# Patient Record
Sex: FEMALE | Race: WHITE | NOT HISPANIC OR LATINO | Employment: UNEMPLOYED | URBAN - METROPOLITAN AREA
[De-identification: names, ages, dates, MRNs, and addresses within clinical notes are randomized per-mention and may not be internally consistent; named-entity substitution may affect disease eponyms.]

---

## 2020-01-24 ENCOUNTER — OFFICE VISIT (OUTPATIENT)
Dept: URGENT CARE | Facility: MEDICAL CENTER | Age: 35
End: 2020-01-24
Payer: COMMERCIAL

## 2020-01-24 VITALS
OXYGEN SATURATION: 98 % | RESPIRATION RATE: 18 BRPM | SYSTOLIC BLOOD PRESSURE: 154 MMHG | HEIGHT: 65 IN | WEIGHT: 203 LBS | TEMPERATURE: 98 F | DIASTOLIC BLOOD PRESSURE: 90 MMHG | BODY MASS INDEX: 33.82 KG/M2 | HEART RATE: 98 BPM

## 2020-01-24 DIAGNOSIS — B96.89 ACUTE BACTERIAL BRONCHITIS: Primary | ICD-10-CM

## 2020-01-24 DIAGNOSIS — J20.8 ACUTE BACTERIAL BRONCHITIS: Primary | ICD-10-CM

## 2020-01-24 LAB — S PYO AG THROAT QL: NEGATIVE

## 2020-01-24 PROCEDURE — 87880 STREP A ASSAY W/OPTIC: CPT | Performed by: PHYSICIAN ASSISTANT

## 2020-01-24 PROCEDURE — G0382 LEV 3 HOSP TYPE B ED VISIT: HCPCS | Performed by: PHYSICIAN ASSISTANT

## 2020-01-24 RX ORDER — ALBUTEROL SULFATE 90 UG/1
2 AEROSOL, METERED RESPIRATORY (INHALATION) EVERY 6 HOURS PRN
Qty: 18 G | Refills: 0 | Status: SHIPPED | OUTPATIENT
Start: 2020-01-24 | End: 2020-02-14 | Stop reason: ALTCHOICE

## 2020-01-24 RX ORDER — AZITHROMYCIN 250 MG/1
TABLET, FILM COATED ORAL
Qty: 6 TABLET | Refills: 0 | Status: SHIPPED | OUTPATIENT
Start: 2020-01-24 | End: 2020-01-28

## 2020-01-24 RX ORDER — LISINOPRIL 20 MG/1
TABLET ORAL
COMMUNITY
Start: 2019-12-30

## 2020-01-24 RX ORDER — FLUTICASONE PROPIONATE 50 MCG
2 SPRAY, SUSPENSION (ML) NASAL DAILY
Qty: 1 BOTTLE | Refills: 0 | Status: SHIPPED | OUTPATIENT
Start: 2020-01-24 | End: 2020-09-16

## 2020-01-25 NOTE — PROGRESS NOTES
[facility] [employer] [facility] [facility]        NAME: Jaqui Barajas is a 37 y o  female  : 1985    MRN: [id number]  DATE: 2020  TIME: 7:45 PM    Assessment and Plan   Acute bacterial bronchitis [J20 8, B96 89]  1  Acute bacterial bronchitis  POCT rapid strepA    azithromycin (ZITHROMAX) 250 mg tablet    fluticasone (FLONASE) 50 mcg/act nasal spray    albuterol (VENTOLIN HFA) 90 mcg/act inhaler         Patient Instructions     Rapid Strep testing in the office today was negative  Continue with supportive care: warm salt water gargles, lozenges, warm humidified air, saline nasal spray, plenty of fluids, plenty of rest, and over the counter pain medication as needed  Follow up with PCP in 3-5 days if symptoms do not resolve  Proceed to the ER with worsening throat pain, fever, chills,difficulties breathing, difficulties tolerating oral intake  Chief Complaint     Chief Complaint   Patient presents with    Cold Like Symptoms     Patient presents with chest congestion, cough with mucous, and body aches for 2 weeks  She reports some hot sweats but denies fever  She notes that she has had some shortness of breath with coughing or going up stairs  She is taking mucinex and advil for her symptoms  History of Present Illness       57-year-old female with no significant past medical history presents with cough, congestion x2 weeks  Patient states that her symptoms have been progressive for her  She states that there was 1 time where she thought her symptoms were resolving, but then she began to cough more  Patient states that her daughter had the flu and she was unable to get a flu shot yet this season  Patient states that she has had chills, but since taken her temperature at home  Patient states that she is eating and drinking normally  She has been using over-the-counter medications to help with her symptoms  Cough   This is a new problem   Episode onset: x 2 weeks  The problem has been unchanged  The cough is productive of sputum (green )  Associated symptoms include chills, a fever ("subjective" ), headaches (generalized ), myalgias, shortness of breath and sweats  Pertinent negatives include no chest pain or postnasal drip  The symptoms are aggravated by lying down and cold air  Risk factors: denies smoking history, denies recet travel  She has tried OTC cough suppressant (mucinex ) for the symptoms  The treatment provided mild relief  Her past medical history is significant for bronchitis  has never been hospitlized for rep  illness        Review of Systems   Review of Systems   Constitutional: Positive for appetite change, chills and fever ("subjective" )  HENT: Negative for congestion and postnasal drip  Respiratory: Positive for cough, chest tightness and shortness of breath  Cardiovascular: Negative for chest pain and palpitations  Gastrointestinal: Negative  Genitourinary: Negative  Musculoskeletal: Positive for myalgias  Neurological: Positive for headaches (generalized )  Negative for dizziness and light-headedness           Current Medications       Current Outpatient Medications:     albuterol (VENTOLIN HFA) 90 mcg/act inhaler, Inhale 2 puffs every 6 (six) hours as needed for wheezing or shortness of breath (coughing), Disp: 18 g, Rfl: 0    azithromycin (ZITHROMAX) 250 mg tablet, Take 2 tablets today then 1 tablet daily x 4 days, Disp: 6 tablet, Rfl: 0    fluticasone (FLONASE) 50 mcg/act nasal spray, 2 sprays into each nostril daily, Disp: 1 Bottle, Rfl: 0    lisinopril (ZESTRIL) 20 mg tablet, , Disp: , Rfl:     Current Allergies     Allergies as of 01/24/2020 - Reviewed 01/24/2020   Allergen Reaction Noted    Penicillins Hives 01/24/2020            The following portions of the patient's history were reviewed and updated as appropriate: allergies, current medications, past family history, past medical history, past social history, past surgical history and problem list      Past Medical History:   Diagnosis Date    Hypertension        Past Surgical History:   Procedure Laterality Date    CHOLECYSTECTOMY         History reviewed  No pertinent family history  Medications have been verified  Objective   /90   Pulse 98   Temp 98 °F (36 7 °C) (Tympanic)   Resp 18   Ht 5' 5" (1 651 m)   Wt 92 1 kg (203 lb)   LMP 12/31/2019   SpO2 98%   BMI 33 78 kg/m²        Physical Exam     Physical Exam   Constitutional: Vital signs are normal  She appears well-developed and well-nourished  She is cooperative  Non-toxic appearance  She does not appear ill  No distress  There is no conversational dyspnea at rest    HENT:   Head: Normocephalic and atraumatic  Right Ear: Hearing, tympanic membrane, external ear and ear canal normal    Left Ear: Hearing, tympanic membrane, external ear and ear canal normal    Nose: Rhinorrhea present  No mucosal edema  Right sinus exhibits no maxillary sinus tenderness and no frontal sinus tenderness  Left sinus exhibits no maxillary sinus tenderness and no frontal sinus tenderness  Mouth/Throat: Uvula is midline and mucous membranes are normal  No uvula swelling  Oropharyngeal exudate and posterior oropharyngeal erythema present  Tonsils are 1+ on the right  Tonsils are 1+ on the left  No tonsillar exudate  Eyes: Pupils are equal, round, and reactive to light  Conjunctivae, EOM and lids are normal    Neck: Neck supple  Cardiovascular: Normal rate, regular rhythm, S1 normal, S2 normal and normal heart sounds  No murmur heard  Pulmonary/Chest: Effort normal and breath sounds normal  No stridor  No respiratory distress  She has no decreased breath sounds  She has no wheezes  She has no rhonchi  She has no rales  Abdominal: Soft  Bowel sounds are normal  There is no tenderness  Lymphadenopathy:     She has no cervical adenopathy  Neurological: She is alert  Skin: Skin is warm and dry  No rash noted   She is not diaphoretic  Psychiatric: She has a normal mood and affect  POCT strep A negative in office today

## 2020-01-25 NOTE — PATIENT INSTRUCTIONS
Began taking antibiotics, Flonase, albuterol inhaler as prescribed  Take antibiotic with food to prevent stomach upset  Rapid Strep testing in the office today was negative  Continue with supportive care: warm salt water gargles, lozenges, warm humidified air, saline nasal spray, plenty of fluids, plenty of rest, and over the counter pain medication as needed  Follow up with PCP in 3-5 days if symptoms do not resolve  Proceed to the ER with worsening throat pain, fever, chills,difficulties breathing, difficulties tolerating oral intake  Acute Bronchitis   WHAT YOU NEED TO KNOW:   Acute bronchitis is swelling and irritation in the air passages of your lungs  This irritation may cause you to cough or have other breathing problems  Acute bronchitis often starts because of another illness, such as a cold or the flu  The illness spreads from your nose and throat to your windpipe and airways  Bronchitis is often called a chest cold  Acute bronchitis lasts about 3 to 6 weeks and is usually not a serious illness  Your cough can last for several weeks  DISCHARGE INSTRUCTIONS:   Return to the emergency department if:   · You cough up blood  · Your lips or fingernails turn blue  · You feel like you are not getting enough air when you breathe  Contact your healthcare provider if:   · You have a fever  · Your breathing problems do not go away or get worse  · Your cough does not get better within 4 weeks  · You have questions or concerns about your condition or care  Self-care:   · Get more rest   Rest helps your body to heal  Slowly start to do more each day  Rest when you feel it is needed  · Avoid irritants in the air  Avoid chemicals, fumes, and dust  Wear a face mask if you must work around dust or fumes  Stay inside on days when air pollution levels are high  If you have allergies, stay inside when pollen counts are high   Do not use aerosol products, such as spray-on deodorant, bug spray, and hair spray  · Do not smoke or be around others who smoke  Nicotine and other chemicals in cigarettes and cigars damages the cilia that move mucus out of your lungs  Ask your healthcare provider for information if you currently smoke and need help to quit  E-cigarettes or smokeless tobacco still contain nicotine  Talk to your healthcare provider before you use these products  · Drink liquids as directed  Liquids help keep your air passages moist and help you cough up mucus  You may need to drink more liquids when you have acute bronchitis  Ask how much liquid to drink each day and which liquids are best for you  · Use a humidifier or vaporizer  Use a cool mist humidifier or a vaporizer to increase air moisture in your home  This may make it easier for you to breathe and help decrease your cough  Decrease risk for acute bronchitis:   · Get the vaccinations you need  Ask your healthcare provider if you should get vaccinated against the flu or pneumonia  · Prevent the spread of germs  You can decrease your risk of acute bronchitis and other illnesses by doing the following:     [facility] your hands often with soap and water  Carry germ-killing hand lotion or gel with you  You can use the lotion or gel to clean your hands when soap and water are not available  ¨ Do not touch your eyes, nose, or mouth unless you have washed your hands first     ¨ Always cover your mouth when you cough to prevent the spread of germs  It is best to cough into a tissue or your shirt sleeve instead of into your hand  Ask those around you cover their mouths when they cough  ¨ Try to avoid people who have a cold or the flu  If you are sick, stay away from others as much as possible  Medicines: Your healthcare provider may  give you any of the following:  · Ibuprofen or acetaminophen  are medicines that help lower your fever  They are available without a doctor's order   Ask your healthcare provider which medicine is right for you  Ask how much to take and how often to take it  Follow directions  These medicines can cause stomach bleeding if not taken correctly  Ibuprofen can cause kidney damage  Do not take ibuprofen if you have kidney disease, an ulcer, or allergies to aspirin  Acetaminophen can cause liver damage  Do not take more than 4,000 milligrams in 24 hours  · Decongestants  help loosen mucus in your lungs and make it easier to cough up  This can help you breathe easier  · Cough suppressants  decrease your urge to cough  If your cough produces mucus, do not take a cough suppressant unless your healthcare provider tells you to  Your healthcare provider may suggest that you take a cough suppressant at night so you can rest     · Inhalers  may be given  Your healthcare provider may give you one or more inhalers to help you breathe easier and cough less  An inhaler gives your medicine to open your airways  Ask your healthcare provider to show you how to use your inhaler correctly  · Take your medicine as directed  Contact your healthcare provider if you think your medicine is not helping or if you have side effects  Tell him of her if you are allergic to any medicine  Keep a list of the medicines, vitamins, and herbs you take  Include the amounts, and when and why you take them  Bring the list or the pill bottles to follow-up visits  Carry your medicine list with you in case of an emergency  Follow up with your healthcare provider as directed:  Write down questions you have so you will remember to ask them during your follow-up visits  © 2017 [employer] Information is for End User's use only and may not be sold, redistributed or otherwise used for commercial purposes  All illustrations and images included in CareNotes® are the copyrighted property of [employer] M [employer]  or [employer]  The above information is an  only   It is not intended as medical advice for individual conditions or treatments  Talk to your doctor, nurse or pharmacist before following any medical regimen to see if it is safe and effective for you

## 2020-02-14 ENCOUNTER — OFFICE VISIT (OUTPATIENT)
Dept: URGENT CARE | Facility: MEDICAL CENTER | Age: 35
End: 2020-02-14
Payer: COMMERCIAL

## 2020-02-14 VITALS
RESPIRATION RATE: 24 BRPM | OXYGEN SATURATION: 100 % | TEMPERATURE: 97.8 F | DIASTOLIC BLOOD PRESSURE: 83 MMHG | WEIGHT: 195 LBS | HEART RATE: 105 BPM | HEIGHT: 65 IN | SYSTOLIC BLOOD PRESSURE: 127 MMHG | BODY MASS INDEX: 32.49 KG/M2

## 2020-02-14 DIAGNOSIS — J06.9 VIRAL UPPER RESPIRATORY TRACT INFECTION: Primary | ICD-10-CM

## 2020-02-14 LAB — S PYO AG THROAT QL: NEGATIVE

## 2020-02-14 PROCEDURE — G0382 LEV 3 HOSP TYPE B ED VISIT: HCPCS | Performed by: PHYSICIAN ASSISTANT

## 2020-02-14 PROCEDURE — 87070 CULTURE OTHR SPECIMN AEROBIC: CPT | Performed by: PHYSICIAN ASSISTANT

## 2020-02-14 PROCEDURE — 87147 CULTURE TYPE IMMUNOLOGIC: CPT | Performed by: PHYSICIAN ASSISTANT

## 2020-02-14 RX ORDER — BROMPHENIRAMINE MALEATE, PSEUDOEPHEDRINE HYDROCHLORIDE, AND DEXTROMETHORPHAN HYDROBROMIDE 2; 30; 10 MG/5ML; MG/5ML; MG/5ML
10 SYRUP ORAL 3 TIMES DAILY PRN
Qty: 120 ML | Refills: 0 | Status: SHIPPED | OUTPATIENT
Start: 2020-02-14 | End: 2020-09-16

## 2020-02-15 NOTE — PROGRESS NOTES
[facility] [employer] [facility]        NAME: Nnamdi Fermin is a 38 y o  female  : 1985    MRN: [id number]  DATE: 2020  TIME: 8:13 PM    Assessment and Plan   Sore throat [J02 9]  1  Sore throat  POCT rapid strepA    Throat culture    brompheniramine-pseudoephedrine-DM 30-2-10 MG/5ML syrup         Patient Instructions   Began taking cough and cold medication as prescribed  Take for the 1st time at home to see how this medication affects you  Rapid Strep testing in the office today was negative  Throat culture will be sent out and you will be called if results are positive  At that time antibiotics will be called into your pharmacy  Continue with supportive care: warm salt water gargles, lozenges, warm humidified air, saline nasal spray, plenty of fluids, plenty of rest, and over the counter pain medication as needed  Follow up with PCP in 3-5 days if symptoms do not resolve  Proceed to the ER with worsening throat pain, fever, chills,difficulties breathing, difficulties tolerating oral intake  Chief Complaint     Chief Complaint   Patient presents with    Sore Throat     Patient states she has a sore throat, nasals ocngestion, ear pain but no fever         History of Present Illness       38year old female with no significant past medical history presents with cold like illness x 3 days  URI    This is a new problem  Episode onset: x3  The problem has been gradually worsening  There has been no fever  Associated symptoms include congestion, ear pain, headaches (sinus pressure ), a plugged ear sensation and a sore throat (worse on the left side )  Pertinent negatives include no chest pain, coughing, diarrhea, nausea, rash, rhinorrhea, sneezing or vomiting  Treatments tried: OTC decongestant  The treatment provided mild relief  Review of Systems   Review of Systems   Constitutional: Negative for chills, fatigue and fever     HENT: Positive for congestion, ear pain and sore throat (worse on the left side )  Negative for hearing loss, rhinorrhea and sneezing  Eyes: Negative  Respiratory: Negative for cough, chest tightness and shortness of breath  Cardiovascular: Negative for chest pain and palpitations  Gastrointestinal: Negative for constipation, diarrhea, nausea and vomiting  Genitourinary: Negative  Skin: Negative for rash  Neurological: Positive for headaches (sinus pressure )  Current Medications       Current Outpatient Medications:     fluticasone (FLONASE) 50 mcg/act nasal spray, 2 sprays into each nostril daily, Disp: 1 Bottle, Rfl: 0    lisinopril (ZESTRIL) 20 mg tablet, , Disp: , Rfl:     brompheniramine-pseudoephedrine-DM 30-2-10 MG/5ML syrup, Take 10 mL by mouth 3 (three) times a day as needed for congestion, cough or allergies, Disp: 120 mL, Rfl: 0    Current Allergies     Allergies as of 02/14/2020 - Reviewed 02/14/2020   Allergen Reaction Noted    Penicillins Hives 01/24/2020            The following portions of the patient's history were reviewed and updated as appropriate: allergies, current medications, past family history, past medical history, past social history, past surgical history and problem list      Past Medical History:   Diagnosis Date    Hypertension        Past Surgical History:   Procedure Laterality Date    CHOLECYSTECTOMY         History reviewed  No pertinent family history  Medications have been verified  Objective   /83   Pulse 105   Temp 97 8 °F (36 6 °C)   Resp (!) 24   Ht 5' 5" (1 651 m)   Wt 88 5 kg (195 lb)   LMP 01/29/2020   SpO2 100%   BMI 32 45 kg/m²        Physical Exam     Physical Exam   Constitutional: She appears well-developed and well-nourished  She is cooperative  She does not appear ill  No distress  HENT:   Head: Normocephalic and atraumatic  Right Ear: Hearing, external ear and ear canal normal  A middle ear effusion is present     Left Ear: Hearing, external ear and ear canal normal  A middle ear effusion is present  Nose: Mucosal edema and rhinorrhea present  Right sinus exhibits no maxillary sinus tenderness and no frontal sinus tenderness  Left sinus exhibits no maxillary sinus tenderness and no frontal sinus tenderness  Mouth/Throat: Uvula is midline and mucous membranes are normal  No uvula swelling  Posterior oropharyngeal erythema present  No oropharyngeal exudate  Tonsils are 1+ on the right  Tonsils are 1+ on the left  No tonsillar exudate  Eyes: Pupils are equal, round, and reactive to light  Conjunctivae, EOM and lids are normal    Neck: Neck supple  Cardiovascular: Normal rate, regular rhythm, S1 normal, S2 normal and normal heart sounds  No murmur heard  Pulmonary/Chest: Effort normal and breath sounds normal  No stridor  No respiratory distress  She has no decreased breath sounds  She has no wheezes  She has no rales  Abdominal: Soft  Bowel sounds are normal  There is no tenderness  Lymphadenopathy:     She has cervical adenopathy  Right cervical: Superficial cervical adenopathy present  Left cervical: Superficial cervical adenopathy present  Neurological: She is alert  Skin: Skin is warm and dry  No rash noted  She is not diaphoretic  Psychiatric: She has a normal mood and affect

## 2020-02-15 NOTE — PATIENT INSTRUCTIONS
Began taking cough and cold medication as prescribed  Take for the 1st time at home to see how this medication affects you  Rapid Strep testing in the office today was negative  Throat culture will be sent out and you will be called if results are positive  At that time antibiotics will be called into your pharmacy  Continue with supportive care: warm salt water gargles, lozenges, warm humidified air, saline nasal spray, plenty of fluids, plenty of rest, and over the counter pain medication as needed  Follow up with PCP in 3-5 days if symptoms do not resolve  Proceed to the ER with worsening throat pain, fever, chills,difficulties breathing, difficulties tolerating oral intake  Pharyngitis   WHAT YOU NEED TO KNOW:   Pharyngitis, or sore throat, is inflammation of the tissues and structures in your pharynx (throat)  Pharyngitis is most often caused by bacteria  It may also be caused by a cold or flu virus  Other causes include smoking, allergies, or acid reflux  DISCHARGE INSTRUCTIONS:   Call 911 for any of the following:   · You have trouble breathing or swallowing because your throat is swollen or sore  Return to the emergency department if:   · You are drooling because it hurts too much to swallow  · Your fever is higher than 102? F (39?C) or lasts longer than 3 days  · You are confused  · You taste blood in your throat  Contact your healthcare provider if:   · Your throat pain gets worse  · You have a painful lump in your throat that does not go away after 5 days  · Your symptoms do not improve after 5 days  · You have questions or concerns about your condition or care  Medicines:  Viral pharyngitis will go away on its own without treatment  Your sore throat should start to feel better in 3 to 5 days for both viral and bacterial infections  You may need any of the following:  · Antibiotics  treat a bacterial infection      · NSAIDs , such as ibuprofen, help decrease swelling, pain, and fever  NSAIDs can cause stomach bleeding or kidney problems in certain people  If you take blood thinner medicine, always ask your healthcare provider if NSAIDs are safe for you  Always read the medicine label and follow directions  · Acetaminophen  decreases pain and fever  It is available without a doctor's order  Ask how much to take and how often to take it  Follow directions  Acetaminophen can cause liver damage if not taken correctly  · Take your medicine as directed  Contact your healthcare provider if you think your medicine is not helping or if you have side effects  Tell him or her if you are allergic to any medicine  Keep a list of the medicines, vitamins, and herbs you take  Include the amounts, and when and why you take them  Bring the list or the pill bottles to follow-up visits  Carry your medicine list with you in case of an emergency  Manage your symptoms:   · Gargle salt water  Mix ¼ teaspoon salt in an 8 ounce glass of warm water and gargle  This may help decrease swelling in your throat  · Drink liquids as directed  You may need to drink more liquids than usual  Liquids may help soothe your throat and prevent dehydration  Ask how much liquid to drink each day and which liquids are best for you  · Use a cool-steam humidifier  to help moisten the air in your room and calm your cough  · Soothe your throat  with cough drops, ice, soft foods, or popsicles  Prevent the spread of pharyngitis:  Cover your mouth and nose when you cough or sneeze  Do not share food or drinks  Wash your hands often  Use soap and water  If soap and water are unavailable, use an alcohol based hand   Follow up with your healthcare provider as directed:  Write down your questions so you remember to ask them during your visits  © 2017 [employer] Information is for End User's use only and may not be sold, redistributed or otherwise used for commercial purposes   All illustrations and images included in CareNotes® are the copyrighted property of [employer]  or [employer]  The above information is an  only  It is not intended as medical advice for individual conditions or treatments  Talk to your doctor, nurse or pharmacist before following any medical regimen to see if it is safe and effective for you

## 2020-02-17 ENCOUNTER — TELEPHONE (OUTPATIENT)
Dept: URGENT CARE | Facility: CLINIC | Age: 35
End: 2020-02-17

## 2020-02-17 LAB — BACTERIA THROAT CULT: ABNORMAL

## 2020-02-20 ENCOUNTER — TELEPHONE (OUTPATIENT)
Dept: URGENT CARE | Facility: MEDICAL CENTER | Age: 35
End: 2020-02-20

## 2020-02-20 NOTE — TELEPHONE ENCOUNTER
Patient return call regarding throat culture results  Patient was told that her culture came back for strep not A,C or G  The patient states that she was seen by her family care physician who started her on antibiotic  She since has been feeling better  Patient states that she still has some soreness and was offered viscous lidocaine to help with her pain but patient denied as she feels like she is improving  Patient questions answered to satisfaction  Patient is agreement to treatment plan

## 2020-09-16 ENCOUNTER — OFFICE VISIT (OUTPATIENT)
Dept: URGENT CARE | Facility: MEDICAL CENTER | Age: 35
End: 2020-09-16
Payer: COMMERCIAL

## 2020-09-16 VITALS
SYSTOLIC BLOOD PRESSURE: 118 MMHG | DIASTOLIC BLOOD PRESSURE: 80 MMHG | BODY MASS INDEX: 35.97 KG/M2 | OXYGEN SATURATION: 99 % | TEMPERATURE: 99.1 F | HEART RATE: 90 BPM | WEIGHT: 203 LBS | RESPIRATION RATE: 16 BRPM | HEIGHT: 63 IN

## 2020-09-16 DIAGNOSIS — R10.9 FLANK PAIN: Primary | ICD-10-CM

## 2020-09-16 LAB
SL AMB  POCT GLUCOSE, UA: ABNORMAL
SL AMB LEUKOCYTE ESTERASE,UA: ABNORMAL
SL AMB POCT BILIRUBIN,UA: ABNORMAL
SL AMB POCT BLOOD,UA: ABNORMAL
SL AMB POCT CLARITY,UA: CLEAR
SL AMB POCT COLOR,UA: YELLOW
SL AMB POCT KETONES,UA: ABNORMAL
SL AMB POCT NITRITE,UA: ABNORMAL
SL AMB POCT PH,UA: 6
SL AMB POCT SPECIFIC GRAVITY,UA: 1.02
SL AMB POCT URINE PROTEIN: ABNORMAL
SL AMB POCT UROBILINOGEN: 0.2

## 2020-09-16 PROCEDURE — 81002 URINALYSIS NONAUTO W/O SCOPE: CPT | Performed by: PHYSICIAN ASSISTANT

## 2020-09-16 PROCEDURE — G0382 LEV 3 HOSP TYPE B ED VISIT: HCPCS | Performed by: PHYSICIAN ASSISTANT

## 2020-09-16 PROCEDURE — 87086 URINE CULTURE/COLONY COUNT: CPT | Performed by: PHYSICIAN ASSISTANT

## 2020-09-17 NOTE — PROGRESS NOTES
[facility]        NAME: Pao Patel is a 38 y o  female  : 1985    MRN: [id number]  DATE: 2020  TIME: 10:14 PM    Assessment and Plan   Flank pain [R10 9]  1  Flank pain  POCT urine dip    Urine culture         Patient Instructions     POCT urine dip showed blood  No leukocytes or nitrites  Will send out for culture  Recommended patient go to the ER to r/o kidney stone, r/o hydronephrosis, r/o pyelonephritis, r/o pancreatitis  Patient is going to [address] [place] [address] [address] [facility] for further evaluation  Chief Complaint     Chief Complaint   Patient presents with    Flank Pain     Pt c/o left flank pain x a few days  History of Present Illness       Flank Pain   This is a new problem  The current episode started in the past 7 days (3-4 days)  The problem occurs constantly  The problem is unchanged  Radiates to: radiates into LUQ and groin  Associated symptoms include abdominal pain and pelvic pain  Pertinent negatives include no bladder incontinence, bowel incontinence, dysuria, fever, headaches, numbness, paresis, paresthesias, perianal numbness, tingling or weakness  (Patient states the pain gets so bad it makes her nauseous  ) Risk factors: hx of pancreatitis in the past, hx of cholelithiasis  She has tried nothing for the symptoms  Review of Systems   Review of Systems   Constitutional: Negative for chills, fatigue and fever  Gastrointestinal: Positive for abdominal pain  Negative for bowel incontinence, constipation, diarrhea, nausea and vomiting  Genitourinary: Positive for flank pain (left sided flank pain) and pelvic pain  Negative for bladder incontinence, difficulty urinating, dysuria, frequency, hematuria and urgency  Neurological: Negative for tingling, weakness, numbness, headaches and paresthesias           Current Medications       Current Outpatient Medications:     lisinopril (ZESTRIL) 20 mg tablet, , Disp: , Rfl:     Current Allergies     Allergies as of 09/16/2020 - Reviewed 09/16/2020   Allergen Reaction Noted    Insulin detemir Hives and Rash 02/16/2015    Penicillins Hives and Other (See Comments) 02/09/2013            The following portions of the patient's history were reviewed and updated as appropriate: allergies, current medications, past family history, past medical history, past social history, past surgical history and problem list      Past Medical History:   Diagnosis Date    Hypertension        Past Surgical History:   Procedure Laterality Date    CHOLECYSTECTOMY         No family history on file  Medications have been verified  Objective   /80   Pulse 90   Temp 99 1 °F (37 3 °C)   Resp 16   Ht 5' 3" (1 6 m)   Wt 92 1 kg (203 lb)   SpO2 99%   BMI 35 96 kg/m²        Physical Exam     Physical Exam  Vitals signs and nursing note reviewed  Constitutional:       General: She is not in acute distress  Appearance: Normal appearance  She is not ill-appearing or toxic-appearing  Cardiovascular:      Rate and Rhythm: Normal rate and regular rhythm  Pulses: Normal pulses  Heart sounds: Normal heart sounds  No murmur  No friction rub  No gallop  Pulmonary:      Effort: Pulmonary effort is normal  No respiratory distress  Breath sounds: Normal breath sounds  No stridor  No wheezing or rhonchi  Abdominal:      General: Abdomen is flat  Bowel sounds are normal       Palpations: There is no mass  Tenderness: There is abdominal tenderness (TTP of LUQ and suprapubic area) in the epigastric area, suprapubic area and left upper quadrant  There is left CVA tenderness  There is no guarding or rebound  Neurological:      General: No focal deficit present  Mental Status: She is alert and oriented to person, place, and time     Psychiatric:         Mood and Affect: Mood normal          Behavior: Behavior normal

## 2020-09-18 ENCOUNTER — TRANSCRIBE ORDERS (OUTPATIENT)
Dept: ADMINISTRATIVE | Facility: HOSPITAL | Age: 35
End: 2020-09-18

## 2020-09-18 DIAGNOSIS — R31.1 BENIGN ESSENTIAL MICROSCOPIC HEMATURIA: Primary | ICD-10-CM

## 2020-09-18 DIAGNOSIS — N20.0 CALCULUS OF KIDNEY: ICD-10-CM

## 2020-09-18 LAB — BACTERIA UR CULT: NORMAL

## 2020-09-19 ENCOUNTER — HOSPITAL ENCOUNTER (OUTPATIENT)
Dept: RADIOLOGY | Facility: HOSPITAL | Age: 35
Discharge: HOME | End: 2020-09-19
Attending: UROLOGY
Payer: COMMERCIAL

## 2020-09-19 ENCOUNTER — HOSPITAL ENCOUNTER (OUTPATIENT)
Dept: ULTRASOUND IMAGING | Facility: HOSPITAL | Age: 35
Discharge: HOME | End: 2020-09-19
Attending: UROLOGY
Payer: COMMERCIAL

## 2020-09-19 ENCOUNTER — TRANSCRIBE ORDERS (OUTPATIENT)
Dept: ADMINISTRATIVE | Facility: HOSPITAL | Age: 35
End: 2020-09-19

## 2020-09-19 DIAGNOSIS — R31.1 BENIGN ESSENTIAL MICROSCOPIC HEMATURIA: ICD-10-CM

## 2020-09-19 DIAGNOSIS — N20.0 URIC ACID NEPHROLITHIASIS: Primary | ICD-10-CM

## 2020-09-19 DIAGNOSIS — N20.0 CALCULUS OF KIDNEY: ICD-10-CM

## 2020-09-19 DIAGNOSIS — N20.0 URIC ACID NEPHROLITHIASIS: ICD-10-CM

## 2020-09-19 PROCEDURE — 74018 RADEX ABDOMEN 1 VIEW: CPT

## 2020-09-19 PROCEDURE — 76770 US EXAM ABDO BACK WALL COMP: CPT

## 2020-10-28 ENCOUNTER — TRANSCRIBE ORDERS (OUTPATIENT)
Dept: ADMINISTRATIVE | Facility: HOSPITAL | Age: 35
End: 2020-10-28

## 2020-10-28 DIAGNOSIS — N20.0 URIC ACID NEPHROLITHIASIS: Primary | ICD-10-CM

## 2020-10-29 ENCOUNTER — HOSPITAL ENCOUNTER (OUTPATIENT)
Dept: ULTRASOUND IMAGING | Facility: HOSPITAL | Age: 35
Discharge: HOME | End: 2020-10-29
Attending: UROLOGY
Payer: COMMERCIAL

## 2020-10-29 ENCOUNTER — HOSPITAL ENCOUNTER (OUTPATIENT)
Dept: RADIOLOGY | Facility: HOSPITAL | Age: 35
Discharge: HOME | End: 2020-10-29
Attending: UROLOGY
Payer: COMMERCIAL

## 2020-10-29 ENCOUNTER — TRANSCRIBE ORDERS (OUTPATIENT)
Dept: ADMINISTRATIVE | Facility: HOSPITAL | Age: 35
End: 2020-10-29

## 2020-10-29 DIAGNOSIS — N20.0 URIC ACID NEPHROLITHIASIS: ICD-10-CM

## 2020-10-29 DIAGNOSIS — N20.0 URIC ACID NEPHROLITHIASIS: Primary | ICD-10-CM

## 2020-10-29 PROCEDURE — 74018 RADEX ABDOMEN 1 VIEW: CPT

## 2020-10-29 PROCEDURE — 76770 US EXAM ABDO BACK WALL COMP: CPT

## 2020-11-20 PROBLEM — I10 ESSENTIAL HYPERTENSION: Status: ACTIVE | Noted: 2020-11-20

## 2020-11-20 PROBLEM — N20.0 NEPHROLITHIASIS: Status: ACTIVE | Noted: 2020-11-20

## 2021-01-26 ENCOUNTER — TELEPHONE (OUTPATIENT)
Dept: UROLOGY | Facility: AMBULATORY SURGERY CENTER | Age: 36
End: 2021-01-26

## 2021-01-26 NOTE — TELEPHONE ENCOUNTER
Spoke to new pt experiencing back and stomach pain with frequent urination and nausea  She states she has a hx of kidney stones  Pt had XR and CT scan done in October 2020  She has a 9mm non-obstructing left lower pole renal calculus and no hydronephrosis  Told pt she would receive a call back to schedule a new pt appt  She is open to other locations besides [facility] to be seen

## 2021-01-26 NOTE — TELEPHONE ENCOUNTER
Please Triage - Halima/Reji  New Patient-     What is the reason for the patients appointment? Kidney Stones - symptoms lower back and stomach pain & frequent urination       Imaging/Lab Results:      Do we accept the patient's insurance or is the patient Self-Pay? Provider & Plan: ANTHONY  Member ID#: [id number]     Has the patient had any previous urologist(s)?  Yes - [facility]         Have patient records been requested? epic       Has the patient had any outside testing done? epic      Does the patient have a personal history of cancer? no      Patient can be reached at :[phone]

## 2021-02-17 NOTE — TELEPHONE ENCOUNTER
Pt called to cancel 2/18 8:45am with Dr Gamboa,if this appointment could be done virtual she would like Telephone visit,please review if in office needed verses virtual and contact pt

## 2021-02-18 ENCOUNTER — TELEMEDICINE (OUTPATIENT)
Dept: UROLOGY | Facility: MEDICAL CENTER | Age: 36
End: 2021-02-18
Payer: COMMERCIAL

## 2021-02-18 DIAGNOSIS — N20.0 CALCULUS OF KIDNEY: Primary | ICD-10-CM

## 2021-02-18 PROCEDURE — 99442 PR PHYS/QHP TELEPHONE EVALUATION 11-20 MIN: CPT | Performed by: UROLOGY

## 2021-02-18 NOTE — PROGRESS NOTES
Virtual Brief Visit   discussion of left lower pole kidney stone which at was first discovered on ultrasound and KUB September 2020  Ultrasound says 7 x 8 mm, KUB says 4 mm  Repeat studies one month later, same findings in October 2020     Saw another urologist who recommended shockwave lithotripsy, but patient ended up cancelling beer she was not comfortable with that recommendation     In terms of pain, she has discomfort left flank, left CVA, occasional radiating to the anterior abdomen  Comes and goes for hours at a time  Not there every day, but several days per week  No other etiology of pain discovered  No prior stones or urinary troubles whatsoever  Assessment/Plan:   I reviewed KUB and ultrasound films and discussed with patient  The stone is in a peripheral lower pole calyx without any obstruction  As such, it is unlikely to be the cause of significant pain  However, I cannot prove it is not the cause of pain, and there could be an occasional patient was a nonobstructing stone that could cause chronic pain  I told patient my opinion that shockwave lithotripsy overall success rate is too low to recommend for a procedure  If she wanted to have the stone  taken care of, I would recommend ureteroscopy, laser, stent  I also said that if we need to look for obstruction, CT scan would be the next study  I discussed 24 urine, usually not considered necessary when I first stone episode  She knows to drink lots of fluids  Above issues discussed in detail, patient knows to follow-up in the office  If she wants to discuss a procedure further  If she were to get severe pain, she would call here and we would order a CT scan before we saw her  Problem List Items Addressed This Visit     None      Visit Diagnoses     Calculus of kidney    -  Primary                Reason for visit is No chief complaint on file         Encounter provider Clinton Osorio MD    Provider located at [address] [facility] [address]    Recent Visits  No visits were found meeting these conditions  Showing recent visits within past 7 days and meeting all other requirements     Today's Visits  Date Type Provider Dept   02/18/21 Ranulfo Davis MD [facility] [place]   Showing today's visits and meeting all other requirements     Future Appointments  No visits were found meeting these conditions  Showing future appointments within next 150 days and meeting all other requirements        After connecting through telephone, the patient was identified by name and date of birth  Barrett Simons was informed that this is a telemedicine visit and that the visit is being conducted through telephone  My office door was closed  No one else was in the room  She acknowledged consent and understanding of privacy and security of the platform  The patient has agreed to participate and understands she can discontinue the visit at any time  Patient is aware this is a billable service  Subjective    Barrett Simons is a 43 y o  female   HPI     Past Medical History:   Diagnosis Date    Hypertension     Kidney stone     Seasonal allergies        Past Surgical History:   Procedure Laterality Date    CHOLECYSTECTOMY         Current Outpatient Medications   Medication Sig Dispense Refill    lisinopril (ZESTRIL) 20 mg tablet        No current facility-administered medications for this visit  Allergies   Allergen Reactions    Insulin Detemir Hives and Rash     Allergic to preservitive in Levemir, switched to Lantus    Penicillins Hives and Other (See Comments)       Review of Systems    There were no vitals filed for this visit        I spent Twenty minutes directly with the patient during this visit    VIRTUAL VISIT [facility] acknowledges that she has consented to an online visit or consultation  She understands that the online visit is based solely on information provided by her, and that, in the absence of a face-to-face physical evaluation by the physician, the diagnosis she receives is both limited and provisional in terms of accuracy and completeness  This is not intended to replace a full medical face-to-face evaluation by the physician  Marianela Mccarthy understands and accepts these terms

## 2021-05-06 ENCOUNTER — TRANSCRIBE ORDERS (OUTPATIENT)
Dept: ADMINISTRATIVE | Facility: HOSPITAL | Age: 36
End: 2021-05-06

## 2021-05-06 DIAGNOSIS — R10.9 UNSPECIFIED ABDOMINAL PAIN: Primary | ICD-10-CM

## 2021-05-06 DIAGNOSIS — R30.0 DYSURIA: ICD-10-CM

## 2021-05-06 DIAGNOSIS — Z87.442 PERSONAL HISTORY OF URINARY CALCULI: ICD-10-CM

## 2021-05-08 ENCOUNTER — HOSPITAL ENCOUNTER (OUTPATIENT)
Dept: ULTRASOUND IMAGING | Facility: HOSPITAL | Age: 36
Discharge: HOME | End: 2021-05-08
Payer: COMMERCIAL

## 2021-05-08 DIAGNOSIS — R30.0 DYSURIA: ICD-10-CM

## 2021-05-08 DIAGNOSIS — Z87.442 PERSONAL HISTORY OF URINARY CALCULI: ICD-10-CM

## 2021-05-08 DIAGNOSIS — R10.9 UNSPECIFIED ABDOMINAL PAIN: ICD-10-CM

## 2021-05-08 PROCEDURE — 76770 US EXAM ABDO BACK WALL COMP: CPT

## 2022-01-28 ENCOUNTER — APPOINTMENT (OUTPATIENT)
Dept: RADIOLOGY | Facility: MEDICAL CENTER | Age: 37
End: 2022-01-28
Payer: COMMERCIAL

## 2022-01-28 ENCOUNTER — OFFICE VISIT (OUTPATIENT)
Dept: URGENT CARE | Facility: MEDICAL CENTER | Age: 37
End: 2022-01-28
Payer: COMMERCIAL

## 2022-01-28 VITALS
OXYGEN SATURATION: 100 % | DIASTOLIC BLOOD PRESSURE: 80 MMHG | TEMPERATURE: 99.4 F | RESPIRATION RATE: 18 BRPM | SYSTOLIC BLOOD PRESSURE: 132 MMHG | HEART RATE: 100 BPM

## 2022-01-28 DIAGNOSIS — R07.89 CHEST DISCOMFORT: ICD-10-CM

## 2022-01-28 DIAGNOSIS — R05.9 COUGH: ICD-10-CM

## 2022-01-28 DIAGNOSIS — R05.9 COUGH: Primary | ICD-10-CM

## 2022-01-28 LAB
ATRIAL RATE: 90 BPM
P AXIS: 63 DEGREES
PR INTERVAL: 146 MS
QRS AXIS: 54 DEGREES
QRSD INTERVAL: 68 MS
QT INTERVAL: 356 MS
QTC INTERVAL: 435 MS
T WAVE AXIS: 62 DEGREES
VENTRICULAR RATE: 90 BPM

## 2022-01-28 PROCEDURE — 93010 ELECTROCARDIOGRAM REPORT: CPT

## 2022-01-28 PROCEDURE — 93005 ELECTROCARDIOGRAM TRACING: CPT | Performed by: PHYSICIAN ASSISTANT

## 2022-01-28 PROCEDURE — G0383 LEV 4 HOSP TYPE B ED VISIT: HCPCS | Performed by: PHYSICIAN ASSISTANT

## 2022-01-28 PROCEDURE — 71046 X-RAY EXAM CHEST 2 VIEWS: CPT

## 2022-01-28 RX ORDER — AZITHROMYCIN 250 MG/1
TABLET, FILM COATED ORAL
Qty: 6 TABLET | Refills: 0 | Status: SHIPPED | OUTPATIENT
Start: 2022-01-28 | End: 2022-02-01

## 2022-01-28 RX ORDER — METHYLPREDNISOLONE 4 MG/1
TABLET ORAL
Qty: 1 EACH | Refills: 0 | Status: SHIPPED | OUTPATIENT
Start: 2022-01-28 | End: 2022-05-18

## 2022-01-29 NOTE — PROGRESS NOTES
[facility]        NAME: Rupesh Moreno is a 42 y o  female  : 1985    MRN: [id number]  DATE: 2022  TIME: 8:51 PM    Assessment and Plan   Cough [R05 9]  1  Cough  XR chest pa & lateral    ECG 12 lead    methylPREDNISolone 4 MG tablet therapy pack    azithromycin (ZITHROMAX) 250 mg tablet   2  Chest discomfort  methylPREDNISolone 4 MG tablet therapy pack    azithromycin (ZITHROMAX) 250 mg tablet       Chest X-ray- possible pneumonia pending radiology report  EKG- Normal sinus rhythm  Patient Instructions       Patient was educated on cough  Patient was prescribed a medrol dose pack and azithromycin  Patient was educated on eating on medications  Patient was told if symptoms persist follow up with PCP  Any shortness of breath, chest pain or high grade fevers go to ED  Chief Complaint     Chief Complaint   Patient presents with    Cough     Patient c/o cough and chest pain  Patient repost that she tested positive for covid 2022         History of Present Illness       Patient is here today complaining of cough and shortness of breath  Patient reports on 22 she was diagnosed with COVID 19  Patient is vaccinated for COVID19 with Issac Miller  Patient is allergic to Penicillins  Patient is not diabetic  Patient reports she has vomited due to coughing so much  Review of Systems   Review of Systems   Constitutional: Positive for fatigue  HENT: Negative  Respiratory: Positive for cough, chest tightness and shortness of breath  Cardiovascular: Negative  Neurological: Negative  Psychiatric/Behavioral: Negative            Current Medications       Current Outpatient Medications:     azithromycin (ZITHROMAX) 250 mg tablet, Take 2 tablets today then 1 tablet daily x 4 days, Disp: 6 tablet, Rfl: 0    lisinopril (ZESTRIL) 20 mg tablet, , Disp: , Rfl:     methylPREDNISolone 4 MG tablet therapy pack, Use as directed on package, Disp: 1 each, Rfl: 0    Current Allergies Allergies as of 01/28/2022 - Reviewed 01/28/2022   Allergen Reaction Noted    Insulin detemir Hives and Rash 02/16/2015    Penicillins Hives and Other (See Comments) 02/09/2013            The following portions of the patient's history were reviewed and updated as appropriate: allergies, current medications, past family history, past medical history, past social history, past surgical history and problem list      Past Medical History:   Diagnosis Date    Hypertension     Kidney stone     Seasonal allergies        Past Surgical History:   Procedure Laterality Date    CHOLECYSTECTOMY         Family History   Problem Relation Age of Onset    No Known Problems Mother     Hypertension Father     Hyperlipidemia Father          Medications have been verified  Objective   /80   Pulse 100   Temp 99 4 °F (37 4 °C)   Resp 18   SpO2 100%   No LMP recorded  Physical Exam     Physical Exam  Constitutional:       Appearance: She is normal weight  HENT:      Head: Normocephalic  Comments: NO pain over frontal or maxillary sinus     Right Ear: Tympanic membrane, ear canal and external ear normal       Left Ear: Tympanic membrane, ear canal and external ear normal       Nose: Nose normal       Mouth/Throat:      Mouth: Mucous membranes are moist       Pharynx: No oropharyngeal exudate or posterior oropharyngeal erythema  Eyes:      Pupils: Pupils are equal, round, and reactive to light  Neck:      Comments: No palpable lymph nodes  Cardiovascular:      Rate and Rhythm: Tachycardia present  Heart sounds: Normal heart sounds  Pulmonary:      Breath sounds: Wheezing present  Abdominal:      Palpations: Abdomen is soft  Tenderness: There is no abdominal tenderness  Neurological:      General: No focal deficit present  Mental Status: She is alert and oriented to person, place, and time     Psychiatric:         Mood and Affect: Mood normal          Behavior: Behavior normal

## 2022-01-29 NOTE — PATIENT INSTRUCTIONS
Patient was educated on cough  Patient was prescribed a medrol dose pack and azithromycin  Patient was educated on eating on medications  Patient was told if symptoms persist follow up with PCP  Any shortness of breath, chest pain or high grade fevers go to ED  Patient was educated on pulse ox  Patient was told if the pulse ox drops below 92 go to ED  Patient was told if pulse is over 125 go to ED  Patient understood  Patient was given a pulse ox  Acute Cough   WHAT YOU NEED TO KNOW:   An acute cough can last up to 3 weeks  Common causes of an acute cough include a cold, allergies, or a lung infection  DISCHARGE INSTRUCTIONS:   Return to the emergency department if:   · You have trouble breathing or feel short of breath  · You cough up blood, or you see blood in your mucus  · You faint or feel weak or dizzy  · You have chest pain when you cough or take a deep breath  · You have new wheezing  Contact your healthcare provider if:   · You have a fever  · Your cough lasts longer than 4 weeks  · Your symptoms do not improve with treatment  · You have questions or concerns about your condition or care  Medicines:   · Medicines  may be needed to stop the cough, decrease swelling in your airways, or help open your airways  Medicine may also be given to help you cough up mucus  Ask your healthcare provider what over-the-counter medicines you can take  If you have an infection caused by bacteria, you may need antibiotics  · Take your medicine as directed  Contact your healthcare provider if you think your medicine is not helping or if you have side effects  Tell him or her if you are allergic to any medicine  Keep a list of the medicines, vitamins, and herbs you take  Include the amounts, and when and why you take them  Bring the list or the pill bottles to follow-up visits  Carry your medicine list with you in case of an emergency      Manage your symptoms:   · Do not smoke and stay away from others who smoke  Nicotine and other chemicals in cigarettes and cigars can cause lung damage and make your cough worse  Ask your healthcare provider for information if you currently smoke and need help to quit  E-cigarettes or smokeless tobacco still contain nicotine  Talk to your healthcare provider before you use these products  · Drink extra liquids as directed  Liquids will help thin and loosen mucus so you can cough it up  Liquids will also help prevent dehydration  Examples of good liquids to drink include water, fruit juice, and broth  Do not drink liquids that contain caffeine  Caffeine can increase your risk for dehydration  Ask your healthcare provider how much liquid to drink each day  · Rest as directed  Do not do activities that make your cough worse, such as exercise  · Use a humidifier or vaporizer  Use a cool mist humidifier or a vaporizer to increase air moisture in your home  This may make it easier for you to breathe and help decrease your cough  · Eat 2 to 5 mL of honey 2 times each day  Honey can help thin mucus and decrease your cough  · Use cough drops or lozenges  These can help decrease throat irritation and your cough  Follow up with your healthcare provider as directed:  Write down your questions so you remember to ask them during your visits  © Copyright [employer] 2021 Information is for End User's use only and may not be sold, redistributed or otherwise used for commercial purposes  All illustrations and images included in CareNotes® are the copyrighted property of [employer]  or [employer]  The above information is an  only  It is not intended as medical advice for individual conditions or treatments  Talk to your doctor, nurse or pharmacist before following any medical regimen to see if it is safe and effective for you

## 2022-05-18 ENCOUNTER — OFFICE VISIT (OUTPATIENT)
Dept: URGENT CARE | Facility: MEDICAL CENTER | Age: 37
End: 2022-05-18
Payer: COMMERCIAL

## 2022-05-18 VITALS
BODY MASS INDEX: 33.32 KG/M2 | OXYGEN SATURATION: 100 % | RESPIRATION RATE: 20 BRPM | HEART RATE: 100 BPM | HEIGHT: 65 IN | TEMPERATURE: 98 F | WEIGHT: 200 LBS

## 2022-05-18 DIAGNOSIS — J20.9 ACUTE BRONCHITIS, UNSPECIFIED ORGANISM: Primary | ICD-10-CM

## 2022-05-18 PROCEDURE — G0382 LEV 3 HOSP TYPE B ED VISIT: HCPCS | Performed by: EMERGENCY MEDICINE

## 2022-05-18 RX ORDER — ALBUTEROL SULFATE 90 UG/1
2 AEROSOL, METERED RESPIRATORY (INHALATION) EVERY 6 HOURS PRN
Qty: 8.5 G | Refills: 0 | Status: SHIPPED | OUTPATIENT
Start: 2022-05-18

## 2022-05-18 RX ORDER — ALBUTEROL SULFATE 90 UG/1
AEROSOL, METERED RESPIRATORY (INHALATION)
COMMUNITY
Start: 2022-03-04 | End: 2022-05-18

## 2022-05-18 RX ORDER — PREDNISONE 20 MG/1
60 TABLET ORAL DAILY
Qty: 15 TABLET | Refills: 0 | Status: SHIPPED | OUTPATIENT
Start: 2022-05-18

## 2022-05-19 NOTE — PROGRESS NOTES
[facility]        NAME: Hima Guerra is a 42 y o  female  : 1985    MRN: [id number]  DATE: May 18, 2022  TIME: 8:33 PM    Assessment and Plan   Acute bronchitis, unspecified organism [J20 9]  1  Acute bronchitis, unspecified organism           Patient Instructions   Acute Bronchitis   WHAT YOU NEED TO KNOW:   Acute bronchitis is swelling and irritation in your lungs  It is usually caused by a virus and most often happens in the winter  Bronchitis may also be caused by bacteria or by a chemical irritant, such as smoke  DISCHARGE INSTRUCTIONS:   Return to the emergency department if:   · You cough up blood  · Your lips or fingernails turn blue  · You feel like you are not getting enough air when you breathe  Call your doctor if:   · Your symptoms do not go away or get worse, even after treatment  · Your cough does not get better within 4 weeks  · You have questions or concerns about your condition or care  Medicines: You may  need any of the following:  · Cough suppressants  decrease your urge to cough  · Decongestants  help loosen mucus in your lungs and make it easier to cough up  This can help you breathe easier  · Inhalers  may be given  Your healthcare provider may give you one or more inhalers to help you breathe easier and cough less  An inhaler gives your medicine to open your airways  Ask your healthcare provider to show you how to use your inhaler correctly  · Antibiotics  may be given for up to 5 days if your bronchitis is caused by bacteria  · Acetaminophen  decreases pain and fever  It is available without a doctor's order  Ask how much to take and how often to take it  Follow directions  Read the labels of all other medicines you are using to see if they also contain acetaminophen, or ask your doctor or pharmacist  Acetaminophen can cause liver damage if not taken correctly   Do not use more than 4 grams (4,000 milligrams) total of acetaminophen in one day  · NSAIDs  help decrease swelling and pain or fever  This medicine is available with or without a doctor's order  NSAIDs can cause stomach bleeding or kidney problems in certain people  If you take blood thinner medicine, always ask your healthcare provider if NSAIDs are safe for you  Always read the medicine label and follow directions  · Take your medicine as directed  Contact your healthcare provider if you think your medicine is not helping or if you have side effects  Tell him of her if you are allergic to any medicine  Keep a list of the medicines, vitamins, and herbs you take  Include the amounts, and when and why you take them  Bring the list or the pill bottles to follow-up visits  Carry your medicine list with you in case of an emergency  Self-care:   · Drink liquids as directed  You may need to drink more liquids than usual to stay hydrated  Ask how much liquid to drink each day and which liquids are best for you  · Use a cool mist humidifier  to increase air moisture in your home  This may make it easier for you to breathe and help decrease your cough  · Get more rest   Rest helps your body to heal  Slowly start to do more each day  Rest when you feel it is needed  · Avoid irritants in the air  Avoid chemicals, fumes, and dust  Wear a face mask if you must work around dust or fumes  Stay inside on days when air pollution levels are high  If you have allergies, stay inside when pollen counts are high  Do not use aerosol products, such as spray-on deodorant, bug spray, and hair spray  · Do not smoke or be around others who are smoking  Nicotine and other chemicals in cigarettes and cigars can cause lung damage  Ask your healthcare provider for information if you currently smoke and need help to quit  E-cigarettes or smokeless tobacco still contain nicotine  Talk to your healthcare provider before you use these products  Prevent acute bronchitis:       1   Ask about vaccines you may need  Get a flu vaccine each year as soon as recommended, usually in September or October  Ask your healthcare provider if you should also get a pneumonia or COVID-19 vaccine  Your healthcare provider can tell you if you should also get other vaccines, and when to get them  2  Prevent the spread of germs  You can decrease your risk for acute bronchitis and other illnesses by doing the following:     ? Wash your hands often with soap and water  Carry germ-killing hand lotion or gel with you  You can use the lotion or gel to clean your hands when soap and water are not available  ? Do not touch your eyes, nose, or mouth unless you have washed your hands first     ? Always cover your mouth when you cough to prevent the spread of germs  It is best to cough into a tissue or your shirt sleeve instead of into your hand  Ask those around you to cover their mouths when they cough  ? Try to avoid people who have a cold or the flu  If you are sick, stay away from others as much as possible  Follow up with your doctor as directed:  Write down questions you have so you will remember to ask them during your follow-up visits  © Copyright [employer] 2022 Information is for End User's use only and may not be sold, redistributed or otherwise used for commercial purposes  All illustrations and images included in CareNotes® are the copyrighted property of [employer]  or Kacy Augustine   The above information is an  only  It is not intended as medical advice for individual conditions or treatments  Talk to your doctor, nurse or pharmacist before following any medical regimen to see if it is safe and effective for you  Follow up with PCP in 3-5 days  Proceed to  ER if symptoms worsen  Chief Complaint     Chief Complaint   Patient presents with    Cold Like Symptoms     Productive (green) cough, sore throat with cough, short of breath with activity x one week   Denies any sick contacts  Is covid vaccinated but not flu vaccinated  History of Present Illness       43 y/o female presents today reporting a cough x 1 week  No fever  Occasionally coughs up green mucous  Has not taken anything for her symptoms  No underlying lung disease  Review of Systems   Review of Systems   Constitutional: Negative for chills, fatigue and fever  HENT: Negative for congestion, postnasal drip, sore throat and trouble swallowing  Respiratory: Positive for cough  Negative for chest tightness and shortness of breath  Gastrointestinal: Negative for abdominal pain  Genitourinary: Negative for dysuria  Musculoskeletal: Negative for back pain  Skin: Negative for rash  Allergic/Immunologic: Negative for immunocompromised state  Neurological: Negative for dizziness, light-headedness and headaches  Psychiatric/Behavioral: Negative for confusion           Current Medications       Current Outpatient Medications:     albuterol (PROVENTIL HFA,VENTOLIN HFA) 90 mcg/act inhaler, INHALE TWO PUFFS EVERY FOUR TO SIX HOURS AS NEEDED FOR COUGH AND WHEEZE, SPACED 60 SECONDS APART, Disp: , Rfl:     lisinopril (ZESTRIL) 20 mg tablet, , Disp: , Rfl:     methylPREDNISolone 4 MG tablet therapy pack, Use as directed on package, Disp: 1 each, Rfl: 0    Current Allergies     Allergies as of 05/18/2022 - Reviewed 05/18/2022   Allergen Reaction Noted    Insulin detemir Hives and Rash 02/16/2015    Penicillins Hives and Other (See Comments) 02/09/2013            The following portions of the patient's history were reviewed and updated as appropriate: allergies, current medications, past family history, past medical history, past social history, past surgical history and problem list      Past Medical History:   Diagnosis Date    Hypertension     Kidney stone     Seasonal allergies        Past Surgical History:   Procedure Laterality Date    CHOLECYSTECTOMY         Family History Problem Relation Age of Onset    No Known Problems Mother     Hypertension Father     Hyperlipidemia Father          Medications have been verified  Objective   Resp 20   Ht 5' 5" (1 651 m)   Wt 90 7 kg (200 lb)   LMP 05/15/2022   BMI 33 28 kg/m²        Physical Exam     Physical Exam  Vitals and nursing note reviewed  Constitutional:       Appearance: Normal appearance  She is not ill-appearing  HENT:      Head: Normocephalic and atraumatic  Right Ear: Tympanic membrane, ear canal and external ear normal       Left Ear: Tympanic membrane, ear canal and external ear normal       Nose: Nose normal       Mouth/Throat:      Mouth: Mucous membranes are moist    Eyes:      Extraocular Movements: Extraocular movements intact  Pupils: Pupils are equal, round, and reactive to light  Cardiovascular:      Rate and Rhythm: Normal rate and regular rhythm  Pulses: Normal pulses  Pulmonary:      Effort: Pulmonary effort is normal       Breath sounds: Normal breath sounds  Musculoskeletal:      Cervical back: Normal range of motion  Skin:     General: Skin is warm and dry  Capillary Refill: Capillary refill takes less than 2 seconds  Neurological:      General: No focal deficit present  Mental Status: She is alert and oriented to person, place, and time     Psychiatric:         Mood and Affect: Mood normal          Behavior: Behavior normal

## 2022-05-19 NOTE — PATIENT INSTRUCTIONS
Acute Bronchitis   WHAT YOU NEED TO KNOW:   Acute bronchitis is swelling and irritation in your lungs  It is usually caused by a virus and most often happens in the winter  Bronchitis may also be caused by bacteria or by a chemical irritant, such as smoke  DISCHARGE INSTRUCTIONS:   Return to the emergency department if:   You cough up blood  Your lips or fingernails turn blue  You feel like you are not getting enough air when you breathe  Call your doctor if:   Your symptoms do not go away or get worse, even after treatment  Your cough does not get better within 4 weeks  You have questions or concerns about your condition or care  Medicines: You may  need any of the following:  Cough suppressants  decrease your urge to cough  Decongestants  help loosen mucus in your lungs and make it easier to cough up  This can help you breathe easier  Inhalers  may be given  Your healthcare provider may give you one or more inhalers to help you breathe easier and cough less  An inhaler gives your medicine to open your airways  Ask your healthcare provider to show you how to use your inhaler correctly  Antibiotics  may be given for up to 5 days if your bronchitis is caused by bacteria  Acetaminophen  decreases pain and fever  It is available without a doctor's order  Ask how much to take and how often to take it  Follow directions  Read the labels of all other medicines you are using to see if they also contain acetaminophen, or ask your doctor or pharmacist  Acetaminophen can cause liver damage if not taken correctly  Do not use more than 4 grams (4,000 milligrams) total of acetaminophen in one day  NSAIDs  help decrease swelling and pain or fever  This medicine is available with or without a doctor's order  NSAIDs can cause stomach bleeding or kidney problems in certain people  If you take blood thinner medicine, always ask your healthcare provider if NSAIDs are safe for you   Always read the medicine label and follow directions  Take your medicine as directed  Contact your healthcare provider if you think your medicine is not helping or if you have side effects  Tell him of her if you are allergic to any medicine  Keep a list of the medicines, vitamins, and herbs you take  Include the amounts, and when and why you take them  Bring the list or the pill bottles to follow-up visits  Carry your medicine list with you in case of an emergency  Self-care:   Drink liquids as directed  You may need to drink more liquids than usual to stay hydrated  Ask how much liquid to drink each day and which liquids are best for you  Use a cool mist humidifier  to increase air moisture in your home  This may make it easier for you to breathe and help decrease your cough  Get more rest   Rest helps your body to heal  Slowly start to do more each day  Rest when you feel it is needed  Avoid irritants in the air  Avoid chemicals, fumes, and dust  Wear a face mask if you must work around dust or fumes  Stay inside on days when air pollution levels are high  If you have allergies, stay inside when pollen counts are high  Do not use aerosol products, such as spray-on deodorant, bug spray, and hair spray  Do not smoke or be around others who are smoking  Nicotine and other chemicals in cigarettes and cigars can cause lung damage  Ask your healthcare provider for information if you currently smoke and need help to quit  E-cigarettes or smokeless tobacco still contain nicotine  Talk to your healthcare provider before you use these products  Prevent acute bronchitis:       Ask about vaccines you may need  Get a flu vaccine each year as soon as recommended, usually in September or October  Ask your healthcare provider if you should also get a pneumonia or COVID-19 vaccine  Your healthcare provider can tell you if you should also get other vaccines, and when to get them  Prevent the spread of germs    You can decrease your risk for acute bronchitis and other illnesses by doing the following:     Wash your hands often with soap and water  Carry germ-killing hand lotion or gel with you  You can use the lotion or gel to clean your hands when soap and water are not available  Do not touch your eyes, nose, or mouth unless you have washed your hands first     Always cover your mouth when you cough to prevent the spread of germs  It is best to cough into a tissue or your shirt sleeve instead of into your hand  Ask those around you to cover their mouths when they cough  Try to avoid people who have a cold or the flu  If you are sick, stay away from others as much as possible  Follow up with your doctor as directed:  Write down questions you have so you will remember to ask them during your follow-up visits  © Copyright [employer] 2022 Information is for End User's use only and may not be sold, redistributed or otherwise used for commercial purposes  All illustrations and images included in CareNotes® are the copyrighted property of [employer]  or Kacy Augustine   The above information is an  only  It is not intended as medical advice for individual conditions or treatments  Talk to your doctor, nurse or pharmacist before following any medical regimen to see if it is safe and effective for you

## 2023-03-17 ENCOUNTER — OFFICE VISIT (OUTPATIENT)
Dept: URGENT CARE | Facility: MEDICAL CENTER | Age: 38
End: 2023-03-17

## 2023-03-17 VITALS
DIASTOLIC BLOOD PRESSURE: 80 MMHG | RESPIRATION RATE: 18 BRPM | TEMPERATURE: 101.4 F | SYSTOLIC BLOOD PRESSURE: 134 MMHG | HEART RATE: 106 BPM | OXYGEN SATURATION: 100 %

## 2023-03-17 DIAGNOSIS — J02.0 STREP PHARYNGITIS: Primary | ICD-10-CM

## 2023-03-17 DIAGNOSIS — R50.9 FEVER, UNSPECIFIED FEVER CAUSE: ICD-10-CM

## 2023-03-17 LAB — S PYO AG THROAT QL: POSITIVE

## 2023-03-17 RX ORDER — LIDOCAINE HYDROCHLORIDE 20 MG/ML
15 SOLUTION OROPHARYNGEAL 4 TIMES DAILY PRN
Qty: 100 ML | Refills: 0 | Status: SHIPPED | OUTPATIENT
Start: 2023-03-17

## 2023-03-17 RX ORDER — CITALOPRAM 20 MG/1
30 TABLET ORAL DAILY
COMMUNITY
Start: 2023-01-17

## 2023-03-17 RX ORDER — AZITHROMYCIN 250 MG/1
TABLET, FILM COATED ORAL
Qty: 6 TABLET | Refills: 0 | Status: SHIPPED | OUTPATIENT
Start: 2023-03-17 | End: 2023-03-21

## 2023-03-18 NOTE — PATIENT INSTRUCTIONS
Rapid strep test: positive  Prescribed antibiotics and viscous lidocaine - take as directed  Treat symptoms as below  Fever/Body Aches: We recommend you take 600mg ibuprofen every 6 hours or tylenol 650mg every 6 hours as needed for fever  If needed, you can alternate these medications so that you take one medication every 3 hours  For instance, at noon take ibuprofen, then at 3pm take tylenol, then at 6pm take ibuprofen  Sore Throat: Salt water gargles with 1 teaspoon of salt dissolved in 6-8 oz of water as needed can help with a sore throat, as can honey, drinking plenty of liquids, eating soft foods  If severe, can utilize OTC chloraseptic spray  Go to the Emergency Department if you experience worsening cough, fever 100 4 ° F or greater  that is not controlled by Tylenol or Ibuprofen, recurrent vomiting, chest pain, shortness of breath, or any other concerning symptoms  Follow up with PCP in 3-5 days  Strep Throat   AMBULATORY CARE:   Strep throat  is a throat infection caused by bacteria  It is easily spread from person to person  Common symptoms include the following:   Sore, red, and swollen throat    Fever and headache     Upset stomach, abdominal pain, or vomiting    White or yellow patches or blisters in the back of your throat    Tender, swollen lumps on the sides of your neck or jaw    Throat pain when you swallow    Call 911 for any of the following: You have trouble breathing  Seek care immediately if:   You have new symptoms like a bad headache, stiff neck, chest pain, or vomiting  You are drooling because you cannot swallow your spit  Contact your healthcare provider if:   You have a fever  You have a rash or ear pain  You have green, yellow-brown, or bloody mucus when you cough or blow your nose  You are unable to drink anything  You have questions or concerns about your condition or care      Treatment for strep throat  may include antibiotic medicine to treat your strep throat  You should feel better within 2 to 3 days after you start antibiotics  You may return to work or school 24 hours after you start antibiotics  Manage strep throat:   Use lozenges, ice, soft foods, or popsicles  to soothe your throat  Drink juice, milk shakes, or soup  if your throat is too sore to eat solid food  Drinking liquids can also help prevent dehydration  Gargle with salt water  Mix ¼ teaspoon salt in a glass of warm water and gargle  This may help reduce swelling in your throat  Do not smoke  Nicotine and other chemicals in cigarettes and cigars can cause lung damage and make your symptoms worse  Ask your healthcare provider for information if you currently smoke and need help to quit  E-cigarettes or smokeless tobacco still contain nicotine  Talk to your healthcare provider before you use these products  Prevent the spread of strep throat:   Wash your hands often  Use soap and water  Wash your hands after you use the bathroom, change a child's diapers, or sneeze  Wash your hands before you prepare or eat food  Do not share food or drinks  Replace your toothbrush after you have taken antibiotics for 24 hours  Follow up with your doctor as directed:  Write down your questions so you remember to ask them during your visits  © Copyright [employer] 2022 Information is for End User's use only and may not be sold, redistributed or otherwise used for commercial purposes  The above information is an  only  It is not intended as medical advice for individual conditions or treatments  Talk to your doctor, nurse or pharmacist before following any medical regimen to see if it is safe and effective for you

## 2023-03-18 NOTE — PROGRESS NOTES
[facility]        NAME: Louis Heart is a 34 y o  female  : 1985    MRN: [id number]  DATE: 2023  TIME: 8:47 PM    Assessment and Plan   Strep pharyngitis [J02 0]  1  Strep pharyngitis  azithromycin (ZITHROMAX) 250 mg tablet    Lidocaine Viscous HCl (XYLOCAINE) 2 % mucosal solution      2  Fever, unspecified fever cause  POCT rapid strepA          Rapid strep test: positive  Prescribed antibiotics and viscous lidocaine - take as directed  Treat symptoms as below  Fever/Body Aches: We recommend you take 600mg ibuprofen every 6 hours or tylenol 650mg every 6 hours as needed for fever  If needed, you can alternate these medications so that you take one medication every 3 hours  For instance, at noon take ibuprofen, then at 3pm take tylenol, then at 6pm take ibuprofen  Sore Throat: Salt water gargles with 1 teaspoon of salt dissolved in 6-8 oz of water as needed can help with a sore throat, as can honey, drinking plenty of liquids, eating soft foods  If severe, can utilize OTC chloraseptic spray  Go to the Emergency Department if you experience worsening cough, fever 100 4 ° F or greater  that is not controlled by Tylenol or Ibuprofen, recurrent vomiting, chest pain, shortness of breath, or any other concerning symptoms  Follow up with PCP in 3-5 days  Patient Instructions       Follow up with PCP in 3-5 days  Proceed to  ER if symptoms worsen  Chief Complaint     Chief Complaint   Patient presents with   • Sore Throat     Patient c/o sore throat and body aches x 1 day Patient reports family member tested positive for strep  History of Present Illness       Sore Throat   This is a new problem  The current episode started yesterday  The problem has been gradually worsening  The pain is worse on the left side  The maximum temperature recorded prior to her arrival was 101 - 101 9 F  The pain is at a severity of 10/10   Associated symptoms include congestion and trouble swallowing (due to pain)  Pertinent negatives include no abdominal pain, coughing, diarrhea or vomiting  Exposure to:  tested positive for strep this morning  Treatments tried: Tylenol, saline nasal rinse, Mucinex  The treatment provided no relief  Review of Systems   Review of Systems   Constitutional: Positive for appetite change, chills, fatigue and fever  HENT: Positive for congestion, rhinorrhea, sore throat and trouble swallowing (due to pain)  Respiratory: Negative for cough  Gastrointestinal: Positive for nausea  Negative for abdominal pain, diarrhea and vomiting  Musculoskeletal: Positive for myalgias  Skin: Negative for rash           Current Medications       Current Outpatient Medications:   •  azithromycin (ZITHROMAX) 250 mg tablet, Take 2 tablets today then 1 tablet daily x 4 days, Disp: 6 tablet, Rfl: 0  •  citalopram (CeleXA) 20 mg tablet, Take 30 mg by mouth daily, Disp: , Rfl:   •  Lidocaine Viscous HCl (XYLOCAINE) 2 % mucosal solution, Swish and swallow 15 mL 4 (four) times a day as needed for mouth pain or discomfort, Disp: 100 mL, Rfl: 0  •  albuterol (ProAir HFA) 90 mcg/act inhaler, Inhale 2 puffs every 6 (six) hours as needed for wheezing, Disp: 8 5 g, Rfl: 0  •  lisinopril (ZESTRIL) 20 mg tablet, , Disp: , Rfl:   •  predniSONE 20 mg tablet, Take 3 tablets (60 mg total) by mouth in the morning , Disp: 15 tablet, Rfl: 0    Current Allergies     Allergies as of 03/17/2023 - Reviewed 03/17/2023   Allergen Reaction Noted   • Insulin detemir Hives and Rash 02/16/2015   • Penicillins Hives and Other (See Comments) 02/09/2013            The following portions of the patient's history were reviewed and updated as appropriate: allergies, current medications, past family history, past medical history, past social history, past surgical history and problem list      Past Medical History:   Diagnosis Date   • Hypertension    • Kidney stone    • Seasonal allergies Past Surgical History:   Procedure Laterality Date   • CHOLECYSTECTOMY         Family History   Problem Relation Age of Onset   • No Known Problems Mother    • Hypertension Father    • Hyperlipidemia Father          Medications have been verified  Objective   /80   Pulse (!) 106   Temp (!) 101 4 °F (38 6 °C)   Resp 18   SpO2 100%        Physical Exam     Physical Exam  Vitals and nursing note reviewed  Constitutional:       General: She is not in acute distress  Appearance: Normal appearance  She is not ill-appearing  HENT:      Right Ear: Tympanic membrane, ear canal and external ear normal       Left Ear: Tympanic membrane, ear canal and external ear normal       Nose: Congestion and rhinorrhea present  Mouth/Throat:      Mouth: Mucous membranes are moist       Pharynx: Posterior oropharyngeal erythema present  No oropharyngeal exudate  Tonsils: 2+ on the right  2+ on the left  Eyes:      General:         Right eye: No discharge  Left eye: No discharge  Extraocular Movements: Extraocular movements intact  Cardiovascular:      Rate and Rhythm: Normal rate and regular rhythm  Pulses: Normal pulses  Heart sounds: Normal heart sounds  Pulmonary:      Effort: Pulmonary effort is normal  No respiratory distress  Breath sounds: Normal breath sounds  No wheezing, rhonchi or rales  Abdominal:      General: Abdomen is flat  Bowel sounds are normal  There is no distension  Palpations: Abdomen is soft  Tenderness: There is no abdominal tenderness  There is no guarding  Musculoskeletal:      Cervical back: Neck supple  Lymphadenopathy:      Cervical: Cervical adenopathy present  Skin:     General: Skin is warm and dry  Neurological:      Mental Status: She is alert